# Patient Record
Sex: FEMALE | Race: ASIAN
[De-identification: names, ages, dates, MRNs, and addresses within clinical notes are randomized per-mention and may not be internally consistent; named-entity substitution may affect disease eponyms.]

---

## 2021-01-07 ENCOUNTER — HOSPITAL ENCOUNTER (EMERGENCY)
Dept: HOSPITAL 72 - EMR | Age: 41
Discharge: HOME | End: 2021-01-07
Payer: COMMERCIAL

## 2021-01-07 VITALS — SYSTOLIC BLOOD PRESSURE: 113 MMHG | DIASTOLIC BLOOD PRESSURE: 70 MMHG

## 2021-01-07 VITALS — BODY MASS INDEX: 24.11 KG/M2 | WEIGHT: 150 LBS | HEIGHT: 66 IN

## 2021-01-07 VITALS — SYSTOLIC BLOOD PRESSURE: 115 MMHG | DIASTOLIC BLOOD PRESSURE: 78 MMHG

## 2021-01-07 DIAGNOSIS — U07.1: Primary | ICD-10-CM

## 2021-01-07 DIAGNOSIS — Z76.89: ICD-10-CM

## 2021-01-07 DIAGNOSIS — Z88.0: ICD-10-CM

## 2021-01-07 PROCEDURE — 99281 EMR DPT VST MAYX REQ PHY/QHP: CPT

## 2021-01-07 NOTE — NUR
ED Nurse Note:



patient sent by Dr Claros and walked in for monocloneal antibody. pt tested 
covid positive on 12/31/20. Pt is aox4, calm and cooperative, VSS, on RA, 
afebrile on triage.

## 2021-01-07 NOTE — EMERGENCY ROOM REPORT
History of Present Illness


General


Chief Complaint:  Upper Respiratory Illness


Source:  Patient





Present Illness


HPI


40-year-old female presents to the emergency department reporting that her PCP 

instructed her to come here for monoclonal antibody infusion for COVID-19.  Upon

initial evaluation in talking with the patient she denies having any significant

past medical history she denies asthma or smoking history.  She denies taking 

any medications other than birth control.  She reports testing positive for 

Covid 19 on January 3. 


She reports cough and body aches.


Allergies:  


Coded Allergies:  


     PENICILLINS (Verified  Allergy, Unknown, 1/7/21)





COVID-19 Screening


Contact w/high risk pt:  No


Experienced COVID-19 symptoms?:  Yes


COVID-19 Testing performed PTA:  Yes


COVID-19 Screening:  Positive COVID-19


COVID-19 Testing Source:  12/31/20





Patient History


Past Medical History:  see triage record


Past Surgical History:  none


Pertinent Family History:  none


Pregnant Now:  No


Reviewed Nursing Documentation:  PMH: Agreed; PSxH: Agreed





Nursing Documentation-PMH


Past Medical History:  No Stated History





Review of Systems


All Other Systems:  negative except mentioned in HPI





Physical Exam





Vital Signs








  Date Time  Temp Pulse Resp B/P (MAP) Pulse Ox O2 Delivery O2 Flow Rate FiO2


 


1/7/21 14:31 98.4 90 20 113/70 (84) 94 Room Air  








Sp02 EP Interpretation:  reviewed, normal


General Appearance:  no apparent distress, alert, GCS 15, non-toxic


Head:  normocephalic, atraumatic


Eyes:  bilateral eye normal inspection, bilateral eye PERRL


ENT:  hearing grossly normal, normal voice


Neck:  full range of motion


Respiratory:  chest non-tender, lungs clear, normal breath sounds, no 

respiratory distress, no accessory muscle use, no wheezing, speaking full 

sentences


Cardiovascular #1:  regular rate, rhythm


Musculoskeletal:  normal range of motion, gait/station normal, non-tender


Neurologic:  alert, motor strength/tone normal, oriented x3, sensory intact, 

responsive, speech normal


Psychiatric:  judgement/insight normal


Skin:  normal color





Medical Decision Making


PA Attestation


Dr. Reid Is my supervising Physician whom patient management has been 

discussed with.


Diagnostic Impression:  


   Primary Impression:  


   Medication requested by patient but not prescribed or administered


   Additional Impression:  


   COVID-19


ER Course


40-year-old female presents to the emergency department reporting that her PCP 

instructed her to come here for monoclonal antibody infusion for COVID-19.  Upon

initial evaluation in talking with the patient she denies having any significant

past medical history she denies asthma or smoking history.  She denies taking 

any medications other than birth control.  She reports testing positive for 

Covid 19 on January 3. 


She reports cough and body aches. 





Ddx considered but are not limited to URI, pneumonia, PE, strep pharyngitis, 

meningitis, COVID-19





Vital signs: Pt. is afebrile, the remaining VS are WNL





H&PE are most consistent with URI- no meningeal signs, oropharynx is not invo

lved, no evidence of bacterial infection at this time. 





ORDERS: none required at this time, the diagnosis is clinical





ED INTERVENTIONS: None required at this time. 





After discussing with this patient that she does not meet current criteria for 

been limited by mom infusion.  Patient insisted that we speak with her PCP Dr. Eric.  After having telephone conversation with him the patient then began 

saying that she has Crohn's disease and is taking "Strata".   I d/w pt. that I 

would need to speak directly with refering physician given her initial 

presentation of appearing to be a young, healthy, competent individual and did 

not initially disclose  Crohns or her medication when questioned regarding the 

Bamlanivimab criteria.    Telephone conversation with Nurse Practitioner Renato 

and  attending physician Dr. Reid.  It was found that the pt. is NOT a 

patient of DIANE Eric.  She wanted to be in his [facility] study.     





D/w pt. regarding telephone conversation. Her medical history then became that 

she has IBS, Chron's is one of the differentials, and that she has an 

appointment to begin medication for it in the future.  





Again, D/W pt. that she does not meet current criteria. 





Pt. denies having any emergency symptoms that she wants evaluation for. 





-I do not identify an emergent condition at this time. With current 

presentation,  pt. is stable for close outpatient follow up and conservative 

treatment.  D/w pt. to return promptly to ED with worsening or new symptoms.- 

Pt. verbalizes' understanding and agreement with proposed treatment plan.





DISCHARGE: At this time pt. is stable for d/c to home. Will provide printed 

patient care instructions, and any necessary prescriptions. Care plan and follow

up instructions have been discussed with the patient prior to discharge.





Last Vital Signs








  Date Time  Temp Pulse Resp B/P (MAP) Pulse Ox O2 Delivery O2 Flow Rate FiO2


 


1/7/21 14:31 98.4 90 20 113/70 (84) 94 Room Air  








Disposition:  HOME, SELF-CARE


Condition:  Stable


Patient Instructions:  Medical Screening Exam





Additional Instructions:  


~ ~ An emergent medical condition has not been identified based on this patients

presentation, exam and any necessary testing/imaging. The patient is determined 

to be stable for outpatient follow-up and management of symptoms by a primary 

care provider.








 ** Follow up with a Primary Care Provider in 3-5 days, even if your symptoms 

have resolved. ** 














- Please note that this Emergency Department Report was dictated using Impulse[employer] technology software, occasionally this can lead to err

oneous entry secondary to interpretation by the dictation equipment.











Melody Wu               Jan 7, 2021 14:41